# Patient Record
Sex: FEMALE | Race: BLACK OR AFRICAN AMERICAN
[De-identification: names, ages, dates, MRNs, and addresses within clinical notes are randomized per-mention and may not be internally consistent; named-entity substitution may affect disease eponyms.]

---

## 2020-05-15 ENCOUNTER — HOSPITAL ENCOUNTER (EMERGENCY)
Dept: HOSPITAL 72 - EMR | Age: 50
Discharge: HOME | End: 2020-05-15
Payer: SELF-PAY

## 2020-05-15 VITALS — HEIGHT: 70 IN | WEIGHT: 160 LBS | BODY MASS INDEX: 22.9 KG/M2

## 2020-05-15 VITALS — SYSTOLIC BLOOD PRESSURE: 147 MMHG | DIASTOLIC BLOOD PRESSURE: 83 MMHG

## 2020-05-15 DIAGNOSIS — L30.9: Primary | ICD-10-CM

## 2020-05-15 PROCEDURE — 99282 EMERGENCY DEPT VISIT SF MDM: CPT

## 2020-05-15 NOTE — NUR
ED Nurse Note:



Recieved pt from home, here with c/o bilat hand burning with skin peeling and 
intermittent edema x 1 month, burning started today and not stopping so worse 
today, both hands have skin peeling and mild swelling at joints, all pulses 
ared present and pt has less movement in finger joints bilat, pt speaks no 
english, has niece at bedside to assist with translation.

## 2020-05-15 NOTE — EMERGENCY ROOM REPORT
History of Present Illness


General


Chief Complaint:  Skin Rash/Abscess


Source:  Patient





Present Illness


HPI


Disclaimer: Please note that this report is being documented using DRAG[employer] 

technology. This can lead to erroneous entry secondary to incorrect 

interpretation by the dictating instrument.





HPI: 50-year-old female presents for evaluation of itchy and raw hands.  

Symptoms present approximately 2 weeks.  She has been getting worse and 

complains of itchiness throughout the day without an obvious trigger.  Denies 

any changes in hand lotion, soaps, clothing, medication.  No known allergies.  

In light of the COVID-19 epidemic the patient has been washing her hands 

throughout the day multiple times and using vinegar as well as bleach as a 

disinfectantl.  Denies any bug bites.  No other areas of itching or skin 

breakdown.  Has been using hydrocortisone cream, Benadryl with some improvement 

but itchiness persist.


 


PMH: Denies


 


PSH: Denies


 


Allergies: Denies


 


Social Hx: Denies


Allergies:  


Coded Allergies:  


     No Known Allergies (Unverified , 5/15/20)





COVID-19 Screening


Contact w/high risk pt:  No


Recent Travel to affected area:  No


Experienced COVID-19 symptoms?:  No


COVID-19 Testing performed PTA:  No





Patient History


Last Menstrual Period:  na


Pregnant Now:  No





Nursing Documentation-PMH


Past Medical History:  No Stated History





Review of Systems


All Other Systems:  negative except mentioned in HPI





Physical Exam





Vital Signs








  Date Time  Temp Pulse Resp B/P (MAP) Pulse Ox O2 Delivery O2 Flow Rate FiO2


 


5/15/20 21:25 98.8 95 18 147/83 (104) 97 Room Air  





 





General: Awake and alert, no acute distress


HEENT: NC/AT. EOMI. 


Resp: Normal work of breathing


Skin: Multiple excoriations over the hands bilaterally.  No edema.  No vesicles

, no ulcers.


MSK: Normal tone and bulk. Moving all extremities.  No obvious deformity.


Neuro: Awake and alert.  Mentating appropriately





Medical Decision Making


Diagnostic Impression:  


 Primary Impression:  


 Eczema


ER Course


This is a 50-year-old female presenting for evaluation of bilateral hand 

itching for the past 2 weeks.  She has been using vinegar to wash her hands in 

light of the COVID-19 epidemic.  Her physical exam is consistent with an atopic 

dermatitis and extensive dry skin.  No evidence of cellulitis, infection or bug 

infestation.  Encouraged her to stop using vinegar and bleach as disinfectants 

on her hands due to excessive drying.  Encouraged her to apply Vaseline or 

other moisturizer.  Will prescribe prednisone and continue Benadryl that she 

has been using.  Encouraged her to follow-up with her PMD.  Discussed reasons 

to return to the emergency department.  She understands and agrees with 

treatment plan.





Last Vital Signs








  Date Time  Temp Pulse Resp B/P (MAP) Pulse Ox O2 Delivery O2 Flow Rate FiO2


 


5/15/20 21:25 98.8 95 18 147/83 (104) 97 Room Air  








Disposition:  HOME, SELF-CARE


Condition:  Stable


Scripts


Prednisone* (PREDNISONE*) 20 Mg Tablet


40 MG ORAL DAILY for 5 Days, #10 TAB


   Prov: Reinaldo Fajardo MD         5/15/20











Reinaldo Fajardo MD May 15, 2020 21:46